# Patient Record
Sex: FEMALE | Race: WHITE | Employment: OTHER | URBAN - NONMETROPOLITAN AREA
[De-identification: names, ages, dates, MRNs, and addresses within clinical notes are randomized per-mention and may not be internally consistent; named-entity substitution may affect disease eponyms.]

---

## 2023-11-12 DIAGNOSIS — I65.23 BILATERAL CAROTID ARTERY STENOSIS: ICD-10-CM

## 2023-11-12 DIAGNOSIS — E78.5 HYPERLIPIDEMIA, UNSPECIFIED HYPERLIPIDEMIA TYPE: ICD-10-CM

## 2023-11-18 PROBLEM — I65.23 BILATERAL CAROTID ARTERY STENOSIS: Status: ACTIVE | Noted: 2023-11-18

## 2023-11-18 PROBLEM — R53.83 FATIGUE: Status: ACTIVE | Noted: 2023-11-18

## 2023-11-18 PROBLEM — E78.5 HYPERLIPIDEMIA: Status: ACTIVE | Noted: 2023-11-18

## 2023-11-18 PROBLEM — I48.21 PERMANENT ATRIAL FIBRILLATION (MULTI): Status: ACTIVE | Noted: 2023-11-18

## 2023-11-18 PROBLEM — R06.02 SHORTNESS OF BREATH: Status: ACTIVE | Noted: 2023-11-18

## 2023-11-18 PROBLEM — I10 ESSENTIAL HYPERTENSION: Status: ACTIVE | Noted: 2023-11-18

## 2023-11-18 RX ORDER — EZETIMIBE 10 MG/1
10 TABLET ORAL DAILY
COMMUNITY
Start: 2023-10-06

## 2023-11-18 RX ORDER — ASPIRIN 325 MG
1 TABLET, DELAYED RELEASE (ENTERIC COATED) ORAL DAILY
COMMUNITY

## 2023-11-18 RX ORDER — METOPROLOL SUCCINATE 25 MG/1
25 TABLET, EXTENDED RELEASE ORAL DAILY
COMMUNITY

## 2023-11-18 RX ORDER — TIZANIDINE 4 MG/1
TABLET ORAL
COMMUNITY

## 2023-11-18 RX ORDER — ROSUVASTATIN CALCIUM 40 MG/1
TABLET, COATED ORAL
COMMUNITY
Start: 2023-07-21 | End: 2024-05-14 | Stop reason: SDUPTHER

## 2023-11-18 RX ORDER — ELECTROLYTES/DEXTROSE
1 SOLUTION, ORAL ORAL DAILY
COMMUNITY

## 2023-11-18 RX ORDER — APIXABAN 5 MG/1
1 TABLET, FILM COATED ORAL 2 TIMES DAILY
COMMUNITY
Start: 2022-02-03

## 2023-11-18 RX ORDER — AMLODIPINE BESYLATE 5 MG/1
1 TABLET ORAL DAILY
COMMUNITY
Start: 2021-01-12

## 2023-11-18 RX ORDER — ASPIRIN 81 MG/1
1 TABLET ORAL DAILY
COMMUNITY
End: 2024-05-14 | Stop reason: ALTCHOICE

## 2023-11-18 RX ORDER — VALSARTAN 80 MG/1
80 TABLET ORAL DAILY
COMMUNITY
Start: 2023-10-06 | End: 2024-04-01

## 2023-11-18 RX ORDER — DULOXETIN HYDROCHLORIDE 30 MG/1
30 CAPSULE, DELAYED RELEASE ORAL DAILY
COMMUNITY
Start: 2023-09-09

## 2023-11-19 RX ORDER — ROSUVASTATIN CALCIUM 40 MG/1
TABLET, COATED ORAL
Qty: 90 TABLET | Refills: 1 | Status: SHIPPED | OUTPATIENT
Start: 2023-11-19 | End: 2024-11-18

## 2024-03-30 DIAGNOSIS — I10 ESSENTIAL HYPERTENSION: ICD-10-CM

## 2024-04-01 RX ORDER — VALSARTAN 80 MG/1
80 TABLET ORAL DAILY
Qty: 90 TABLET | Refills: 3 | Status: SHIPPED | OUTPATIENT
Start: 2024-04-01

## 2024-05-14 ENCOUNTER — OFFICE VISIT (OUTPATIENT)
Dept: CARDIOLOGY | Facility: CLINIC | Age: 84
End: 2024-05-14
Payer: MEDICARE

## 2024-05-14 VITALS
SYSTOLIC BLOOD PRESSURE: 158 MMHG | BODY MASS INDEX: 32.25 KG/M2 | WEIGHT: 182 LBS | DIASTOLIC BLOOD PRESSURE: 86 MMHG | HEART RATE: 70 BPM | HEIGHT: 63 IN

## 2024-05-14 DIAGNOSIS — I48.21 PERMANENT ATRIAL FIBRILLATION (MULTI): Primary | ICD-10-CM

## 2024-05-14 DIAGNOSIS — E78.2 MIXED HYPERLIPIDEMIA: ICD-10-CM

## 2024-05-14 DIAGNOSIS — I10 ESSENTIAL HYPERTENSION: ICD-10-CM

## 2024-05-14 DIAGNOSIS — R53.82 CHRONIC FATIGUE: ICD-10-CM

## 2024-05-14 DIAGNOSIS — R06.02 SHORTNESS OF BREATH: ICD-10-CM

## 2024-05-14 DIAGNOSIS — I65.23 BILATERAL CAROTID ARTERY STENOSIS: ICD-10-CM

## 2024-05-14 DIAGNOSIS — Z87.891 FORMER SMOKER: ICD-10-CM

## 2024-05-14 PROCEDURE — 1159F MED LIST DOCD IN RCRD: CPT | Performed by: INTERNAL MEDICINE

## 2024-05-14 PROCEDURE — 99214 OFFICE O/P EST MOD 30 MIN: CPT | Performed by: INTERNAL MEDICINE

## 2024-05-14 PROCEDURE — 3077F SYST BP >= 140 MM HG: CPT | Performed by: INTERNAL MEDICINE

## 2024-05-14 PROCEDURE — 3079F DIAST BP 80-89 MM HG: CPT | Performed by: INTERNAL MEDICINE

## 2024-05-14 PROCEDURE — 93000 ELECTROCARDIOGRAM COMPLETE: CPT | Performed by: INTERNAL MEDICINE

## 2024-05-14 PROCEDURE — 1160F RVW MEDS BY RX/DR IN RCRD: CPT | Performed by: INTERNAL MEDICINE

## 2024-05-14 PROCEDURE — 1036F TOBACCO NON-USER: CPT | Performed by: INTERNAL MEDICINE

## 2024-05-14 RX ORDER — MAGNESIUM HYDROXIDE 400 MG/5ML
1 SUSPENSION, ORAL (FINAL DOSE FORM) ORAL DAILY
COMMUNITY

## 2024-05-14 RX ORDER — LUTEIN 10 MG
1 TABLET ORAL DAILY
COMMUNITY

## 2024-05-14 ASSESSMENT — ENCOUNTER SYMPTOMS: DYSPNEA ON EXERTION: 1

## 2024-05-14 NOTE — LETTER
May 14, 2024     Vinny Bolden DO  [address]  [facility] [address]    Patient: Desiree Dean   YOB: 1940   Date of Visit: 5/14/2024       Dear Dr. Vinny Bolden, :    Thank you for referring Desiree Dean to me for evaluation. Below are my notes for this consultation.  If you have questions, please do not hesitate to call me. I look forward to following your patient along with you.       Sincerely,     Alexandra Vital MD      CC: No Recipients  ______________________________________________________________________________________    Subjective   Desiree Dean is a 83 y.o. female       Chief Complaint    Follow-up          HPI        Patient is here for follow-up continue management for chronic permanent atrial fibrillation, long-term anticoagulation, bilateral carotid disease, hyperlipidemia and hypertension.  Since last time I saw her she denies any change in cardiac status or symptoms she describes some chronic fatigue and tiredness unchanged from before.  Her EKG showed A-fib with controlled rate.  Her recent laboratory data noted and marked for lipid is suboptimally controlled.  Patient admits she has not been very compliant with taking her medication.  In addition her blood pressure noted to be elevated.    ASSESSMENT:      1. Chronic permanent atrial fibrillation. Heart rate controlled on long-term anticoagulation with Eliquis. Completely asymptomatic  2. Prior complaint of fatigue, tiredness, shortness of breath . Per stress test and echocardiogram were reassuring. Symptoms appears to be stable unchanged from before  3. bilateral carotid disease and carotid January 2023 noted and reviewed with her  4. Hyperlipidemia.  Lipids seem to have worsened patient admits to noncompliance with her medic occasion and has not been taking them regularly  5. Hypertension, optimally controlled but she states that she has not taken her medication in the morning  6. Obesity with no  "significant weight changes           RECOMMENDATION:      1. Patient was advised to continue present medical regimen.  I advised him to have a blood pressure check in few weeks if blood pressure remains elevated will adjust  2. I advised her to lose weight to exercise and continue with risk factor adjustment.  3.  I reviewed her recent lab work with her  4. Risk, benefits and alternative anticoagulation reviewed with patient at length she understood and agreed   5. Follow-up in 1 year    Review of Systems   Constitutional: Positive for malaise/fatigue.   Cardiovascular:  Positive for chest pain and dyspnea on exertion.            Vitals:    05/14/24 1448   BP: 158/86   BP Location: Right arm   Patient Position: Sitting   Pulse: 70   Weight: 82.6 kg (182 lb)   Height: 1.6 m (5' 3\")      EKG done in office today    Objective   Physical Exam  Constitutional:       Appearance: Normal appearance.   HENT:      Nose: Nose normal.   Neck:      Vascular: No carotid bruit.   Cardiovascular:      Rate and Rhythm: Normal rate. Rhythm irregularly irregular.      Pulses: Normal pulses.      Heart sounds: Murmur heard.      Systolic murmur is present with a grade of 2/6.   Pulmonary:      Effort: Pulmonary effort is normal.   Abdominal:      General: Bowel sounds are normal.      Palpations: Abdomen is soft.   Musculoskeletal:         General: Normal range of motion.      Cervical back: Normal range of motion.      Right lower leg: No edema.      Left lower leg: No edema.   Skin:     General: Skin is warm and dry.   Neurological:      General: No focal deficit present.      Mental Status: She is alert.   Psychiatric:         Mood and Affect: Mood normal.         Behavior: Behavior normal.         Thought Content: Thought content normal.         Judgment: Judgment normal.         Allergies  Atorvastatin     Current Medications    Current Outpatient Medications:   •  amLODIPine (Norvasc) 5 mg tablet, Take 1 tablet (5 mg) by mouth " once daily., Disp: , Rfl:   •  biotin 5 mg capsule, Take 1 capsule (5 mg) by mouth once daily., Disp: , Rfl:   •  cholecalciferol (Vitamin D-3) 50,000 unit capsule, Take 1 capsule (50,000 Units) by mouth once daily., Disp: , Rfl:   •  cyanocobalamin, vitamin B-12, 5,000 mcg tablet,disintegrating, 1 tablet once daily., Disp: , Rfl:   •  DULoxetine (Cymbalta) 30 mg DR capsule, Take 1 capsule (30 mg) by mouth once daily., Disp: , Rfl:   •  Eliquis 5 mg tablet, Take 1 tablet (5 mg) by mouth 2 times a day., Disp: , Rfl:   •  ezetimibe (Zetia) 10 mg tablet, Take 1 tablet (10 mg) by mouth once daily., Disp: , Rfl:   •  lutein 10 mg tablet, 1 tablet once daily., Disp: , Rfl:   •  metoprolol succinate XL (Toprol-XL) 25 mg 24 hr tablet, Take 1 tablet (25 mg) by mouth once daily., Disp: , Rfl:   •  rosuvastatin (Crestor) 40 mg tablet, TAKE 1 TABLET BY MOUTH AT BEDTIME. STOP PRAVASTATIN, Disp: 90 tablet, Rfl: 1  •  tiZANidine (Zanaflex) 4 mg tablet, TAKE 1 TABLET BY MOUTH THREE TIMES DAILY AS NEEDED FOR 30 DAYS, Disp: , Rfl:   •  valsartan (Diovan) 80 mg tablet, Take 1 tablet by mouth once daily, Disp: 90 tablet, Rfl: 3                     Assessment/Plan   1. Permanent atrial fibrillation (Multi)  ECG 12 Lead    Follow Up In Cardiology      2. Essential hypertension  Follow Up In Cardiology      3. Mixed hyperlipidemia        4. Bilateral carotid artery stenosis        5. Shortness of breath        6. Chronic fatigue        7. BMI 32.0-32.9,adult        8. Former smoker                 Scribe Attestation  By signing my name below, I, Miriam Valdes LPN   attest that this documentation has been prepared under the direction and in the presence of Alexandra Vital MD.     Provider Attestation - Scribe documentation    All medical record entries made by the Scribe were at my direction and personally dictated by me. I have reviewed the chart and agree that the record accurately reflects my personal performance of the  history, physical exam, discussion and plan.

## 2024-05-14 NOTE — PROGRESS NOTES
Subjective   Desiree Dean is a 83 y.o. female       Chief Complaint    Follow-up          HPI        Patient is here for follow-up continue management for chronic permanent atrial fibrillation, long-term anticoagulation, bilateral carotid disease, hyperlipidemia and hypertension.  Since last time I saw her she denies any change in cardiac status or symptoms she describes some chronic fatigue and tiredness unchanged from before.  Her EKG showed A-fib with controlled rate.  Her recent laboratory data noted and marked for lipid is suboptimally controlled.  Patient admits she has not been very compliant with taking her medication.  In addition her blood pressure noted to be elevated.    ASSESSMENT:      1. Chronic permanent atrial fibrillation. Heart rate controlled on long-term anticoagulation with Eliquis. Completely asymptomatic  2. Prior complaint of fatigue, tiredness, shortness of breath . Per stress test and echocardiogram were reassuring. Symptoms appears to be stable unchanged from before  3. bilateral carotid disease and carotid January 2023 noted and reviewed with her  4. Hyperlipidemia.  Lipids seem to have worsened patient admits to noncompliance with her medic occasion and has not been taking them regularly  5. Hypertension, optimally controlled but she states that she has not taken her medication in the morning  6. Obesity with no significant weight changes           RECOMMENDATION:      1. Patient was advised to continue present medical regimen.  I advised him to have a blood pressure check in few weeks if blood pressure remains elevated will adjust  2. I advised her to lose weight to exercise and continue with risk factor adjustment.  3.  I reviewed her recent lab work with her  4. Risk, benefits and alternative anticoagulation reviewed with patient at length she understood and agreed   5. Follow-up in 1 year    Review of Systems   Constitutional: Positive for malaise/fatigue.   Cardiovascular:   "Positive for chest pain and dyspnea on exertion.            Vitals:    05/14/24 1448   BP: 158/86   BP Location: Right arm   Patient Position: Sitting   Pulse: 70   Weight: 82.6 kg (182 lb)   Height: 1.6 m (5' 3\")      EKG done in office today    Objective   Physical Exam  Constitutional:       Appearance: Normal appearance.   HENT:      Nose: Nose normal.   Neck:      Vascular: No carotid bruit.   Cardiovascular:      Rate and Rhythm: Normal rate. Rhythm irregularly irregular.      Pulses: Normal pulses.      Heart sounds: Murmur heard.      Systolic murmur is present with a grade of 2/6.   Pulmonary:      Effort: Pulmonary effort is normal.   Abdominal:      General: Bowel sounds are normal.      Palpations: Abdomen is soft.   Musculoskeletal:         General: Normal range of motion.      Cervical back: Normal range of motion.      Right lower leg: No edema.      Left lower leg: No edema.   Skin:     General: Skin is warm and dry.   Neurological:      General: No focal deficit present.      Mental Status: She is alert.   Psychiatric:         Mood and Affect: Mood normal.         Behavior: Behavior normal.         Thought Content: Thought content normal.         Judgment: Judgment normal.         Allergies  Atorvastatin     Current Medications    Current Outpatient Medications:     amLODIPine (Norvasc) 5 mg tablet, Take 1 tablet (5 mg) by mouth once daily., Disp: , Rfl:     biotin 5 mg capsule, Take 1 capsule (5 mg) by mouth once daily., Disp: , Rfl:     cholecalciferol (Vitamin D-3) 50,000 unit capsule, Take 1 capsule (50,000 Units) by mouth once daily., Disp: , Rfl:     cyanocobalamin, vitamin B-12, 5,000 mcg tablet,disintegrating, 1 tablet once daily., Disp: , Rfl:     DULoxetine (Cymbalta) 30 mg DR capsule, Take 1 capsule (30 mg) by mouth once daily., Disp: , Rfl:     Eliquis 5 mg tablet, Take 1 tablet (5 mg) by mouth 2 times a day., Disp: , Rfl:     ezetimibe (Zetia) 10 mg tablet, Take 1 tablet (10 mg) by " mouth once daily., Disp: , Rfl:     lutein 10 mg tablet, 1 tablet once daily., Disp: , Rfl:     metoprolol succinate XL (Toprol-XL) 25 mg 24 hr tablet, Take 1 tablet (25 mg) by mouth once daily., Disp: , Rfl:     rosuvastatin (Crestor) 40 mg tablet, TAKE 1 TABLET BY MOUTH AT BEDTIME. STOP PRAVASTATIN, Disp: 90 tablet, Rfl: 1    tiZANidine (Zanaflex) 4 mg tablet, TAKE 1 TABLET BY MOUTH THREE TIMES DAILY AS NEEDED FOR 30 DAYS, Disp: , Rfl:     valsartan (Diovan) 80 mg tablet, Take 1 tablet by mouth once daily, Disp: 90 tablet, Rfl: 3                     Assessment/Plan   1. Permanent atrial fibrillation (Multi)  ECG 12 Lead    Follow Up In Cardiology      2. Essential hypertension  Follow Up In Cardiology      3. Mixed hyperlipidemia        4. Bilateral carotid artery stenosis        5. Shortness of breath        6. Chronic fatigue        7. BMI 32.0-32.9,adult        8. Former smoker                 Scribe Attestation  By signing my name below, I, Archana Valdes LPNe   attest that this documentation has been prepared under the direction and in the presence of Alexandra Vital MD.     Provider Attestation - Scribe documentation    All medical record entries made by the Scribe were at my direction and personally dictated by me. I have reviewed the chart and agree that the record accurately reflects my personal performance of the history, physical exam, discussion and plan.

## 2024-05-14 NOTE — PATIENT INSTRUCTIONS
Please bring all medicines, vitamins, and herbal supplements with you when you come to the office.    Prescriptions will not be filled unless you are compliant with your follow up appointments or have a follow up appointment scheduled as per instruction of your physician. Refills should be requested at the time of your visit.     BMI was above normal measurement. Current weight: 82.6 kg (182 lb)  Weight change since last visit (-) denotes wt loss -2 lbs   Weight loss needed to achieve BMI 25: 41.2 Lbs  Weight loss needed to achieve BMI 30: 13 Lbs  Provided instructions on dietary changes  Provided instructions on exercise.

## 2024-06-14 ENCOUNTER — APPOINTMENT (OUTPATIENT)
Dept: CARDIOLOGY | Facility: CLINIC | Age: 84
End: 2024-06-14
Payer: MEDICARE

## 2024-06-17 ENCOUNTER — APPOINTMENT (OUTPATIENT)
Dept: CARDIOLOGY | Facility: CLINIC | Age: 84
End: 2024-06-17
Payer: MEDICARE

## 2024-06-17 VITALS
BODY MASS INDEX: 32.21 KG/M2 | HEIGHT: 63 IN | WEIGHT: 181.8 LBS | SYSTOLIC BLOOD PRESSURE: 168 MMHG | HEART RATE: 66 BPM | DIASTOLIC BLOOD PRESSURE: 96 MMHG

## 2024-06-17 DIAGNOSIS — I10 ESSENTIAL HYPERTENSION: ICD-10-CM

## 2024-06-17 PROCEDURE — 1036F TOBACCO NON-USER: CPT | Performed by: INTERNAL MEDICINE

## 2024-06-17 PROCEDURE — 3077F SYST BP >= 140 MM HG: CPT | Performed by: INTERNAL MEDICINE

## 2024-06-17 PROCEDURE — 99212 OFFICE O/P EST SF 10 MIN: CPT | Performed by: INTERNAL MEDICINE

## 2024-06-17 PROCEDURE — 1160F RVW MEDS BY RX/DR IN RCRD: CPT | Performed by: INTERNAL MEDICINE

## 2024-06-17 PROCEDURE — 1159F MED LIST DOCD IN RCRD: CPT | Performed by: INTERNAL MEDICINE

## 2024-06-17 PROCEDURE — 3080F DIAST BP >= 90 MM HG: CPT | Performed by: INTERNAL MEDICINE

## 2024-06-17 RX ORDER — AMLODIPINE BESYLATE 10 MG/1
10 TABLET ORAL DAILY
Qty: 90 TABLET | Refills: 3 | Status: SHIPPED | OUTPATIENT
Start: 2024-06-17 | End: 2025-06-17

## 2024-06-17 NOTE — PROGRESS NOTES
"Subjective   Desiree Dean is a 83 y.o. female       Chief Complaint    Blood Pressure Check          HPI   Patient is here for follow-up continue management for hypertension.  Recently she was noted to be hypertensive.  She was advised to monitor her blood pressure and to come back for a recheck.  Her blood pressure remains elevated.  She has not been checking her blood pressure on her own.    Assessment    1.  Hypertension not well-controlled    Plan    1.  I counseled the patient regarding nonpharmacologic approach for management for hypertension including salt restriction, losing weight, exercise and DASH diet  2.  I advised the patient to increase her amlodipine to 10 mg daily and to come back in few weeks for blood pressure check  ROS         Vitals:    06/17/24 1448 06/17/24 1451   BP: 168/90 (!) 168/96   BP Location: Right arm Left arm   Patient Position: Sitting Sitting   Pulse: 66    Weight: 82.5 kg (181 lb 12.8 oz)    Height: 1.6 m (5' 3\")         Objective   Physical Exam    Allergies  Atorvastatin     Current Medications    Current Outpatient Medications:     biotin 5 mg capsule, Take 1 capsule (5 mg) by mouth once daily., Disp: , Rfl:     cholecalciferol (Vitamin D-3) 50,000 unit capsule, Take 1 capsule (50,000 Units) by mouth once daily., Disp: , Rfl:     cyanocobalamin, vitamin B-12, 5,000 mcg tablet,disintegrating, 1 tablet once daily., Disp: , Rfl:     DULoxetine (Cymbalta) 30 mg DR capsule, Take 1 capsule (30 mg) by mouth once daily., Disp: , Rfl:     Eliquis 5 mg tablet, Take 1 tablet (5 mg) by mouth 2 times a day., Disp: , Rfl:     ezetimibe (Zetia) 10 mg tablet, Take 1 tablet (10 mg) by mouth once daily., Disp: , Rfl:     lutein 10 mg tablet, 1 tablet once daily., Disp: , Rfl:     metoprolol succinate XL (Toprol-XL) 25 mg 24 hr tablet, Take 1 tablet (25 mg) by mouth once daily., Disp: , Rfl:     rosuvastatin (Crestor) 40 mg tablet, TAKE 1 TABLET BY MOUTH AT BEDTIME. STOP PRAVASTATIN, Disp: 90 " tablet, Rfl: 1    tiZANidine (Zanaflex) 4 mg tablet, TAKE 1 TABLET BY MOUTH THREE TIMES DAILY AS NEEDED FOR 30 DAYS, Disp: , Rfl:     valsartan (Diovan) 80 mg tablet, Take 1 tablet by mouth once daily, Disp: 90 tablet, Rfl: 3    amLODIPine (Norvasc) 10 mg tablet, Take 1 tablet (10 mg) by mouth once daily., Disp: 90 tablet, Rfl: 3                     Assessment/Plan   1. Essential hypertension  Follow Up In Cardiology    amLODIPine (Norvasc) 10 mg tablet    Follow Up In Cardiology               Scribe Attestation  By signing my name below, I, Diana APPLE LPN   , Teet[place]   attest that this documentation has been prepared under the direction and in the presence of Alexandra Vital MD.     Provider Attestation - Scribe documentation    All medical record entries made by the Scribe were at my direction and personally dictated by me. I have reviewed the chart and agree that the record accurately reflects my personal performance of the history, physical exam, discussion and plan.

## 2024-07-30 DIAGNOSIS — E78.5 HYPERLIPIDEMIA, UNSPECIFIED HYPERLIPIDEMIA TYPE: ICD-10-CM

## 2024-07-30 DIAGNOSIS — I65.23 BILATERAL CAROTID ARTERY STENOSIS: ICD-10-CM

## 2024-07-30 RX ORDER — ROSUVASTATIN CALCIUM 40 MG/1
40 TABLET, COATED ORAL DAILY
Qty: 90 TABLET | Refills: 3 | Status: SHIPPED | OUTPATIENT
Start: 2024-07-30 | End: 2025-07-30

## 2024-07-31 ENCOUNTER — APPOINTMENT (OUTPATIENT)
Dept: CARDIOLOGY | Facility: CLINIC | Age: 84
End: 2024-07-31
Payer: MEDICARE

## 2024-08-27 ENCOUNTER — APPOINTMENT (OUTPATIENT)
Dept: CARDIOLOGY | Facility: CLINIC | Age: 84
End: 2024-08-27
Payer: MEDICARE

## 2024-10-03 ENCOUNTER — APPOINTMENT (OUTPATIENT)
Dept: CARDIOLOGY | Facility: CLINIC | Age: 84
End: 2024-10-03
Payer: MEDICARE

## 2024-10-22 ENCOUNTER — APPOINTMENT (OUTPATIENT)
Dept: CARDIOLOGY | Facility: CLINIC | Age: 84
End: 2024-10-22
Payer: MEDICARE

## 2024-10-22 VITALS
SYSTOLIC BLOOD PRESSURE: 130 MMHG | HEIGHT: 63 IN | HEART RATE: 64 BPM | WEIGHT: 182 LBS | DIASTOLIC BLOOD PRESSURE: 76 MMHG | BODY MASS INDEX: 32.25 KG/M2

## 2024-10-22 DIAGNOSIS — I10 ESSENTIAL HYPERTENSION: ICD-10-CM

## 2024-10-22 DIAGNOSIS — Z87.891 FORMER SMOKER: ICD-10-CM

## 2024-10-22 PROCEDURE — 99212 OFFICE O/P EST SF 10 MIN: CPT | Performed by: INTERNAL MEDICINE

## 2024-10-22 PROCEDURE — 3078F DIAST BP <80 MM HG: CPT | Performed by: INTERNAL MEDICINE

## 2024-10-22 PROCEDURE — 3075F SYST BP GE 130 - 139MM HG: CPT | Performed by: INTERNAL MEDICINE

## 2024-10-22 PROCEDURE — 1159F MED LIST DOCD IN RCRD: CPT | Performed by: INTERNAL MEDICINE

## 2024-10-22 PROCEDURE — G2211 COMPLEX E/M VISIT ADD ON: HCPCS | Performed by: INTERNAL MEDICINE

## 2024-10-22 RX ORDER — VIT C/E/ZN/COPPR/LUTEIN/ZEAXAN 250MG-90MG
25 CAPSULE ORAL DAILY
COMMUNITY

## 2024-10-22 NOTE — PROGRESS NOTES
"Subjective   Desiree Dean is a 83 y.o. female       Chief Complaint    Follow-up          HPI   Patient is here for follow-up.  She recently was seen her blood pressure was suboptimally controlled.  We increased her metoprolol.  Since then she feels well.  Her blood pressure has been well-controlled.  She has no side effect.    Assessment    1.  Hypertension well-controlled    Plan    Continue present medical regimen and follow-up in 6 months  ROS         Vitals:    10/22/24 1321   BP: 130/76   BP Location: Left arm   Patient Position: Sitting   Pulse: 64   Weight: 82.6 kg (182 lb)   Height: 1.6 m (5' 3\")        Objective   Physical Exam    Allergies  Atorvastatin     Current Medications    Current Outpatient Medications:     amLODIPine (Norvasc) 10 mg tablet, Take 1 tablet (10 mg) by mouth once daily., Disp: 90 tablet, Rfl: 3    biotin 5 mg capsule, Take 1 capsule (5 mg) by mouth once daily., Disp: , Rfl:     cholecalciferol (Vitamin D3) 25 MCG (1000 UT) capsule, Take 1 capsule (25 mcg) by mouth once daily., Disp: , Rfl:     cyanocobalamin, vitamin B-12, 5,000 mcg tablet,disintegrating, 1 tablet once daily., Disp: , Rfl:     DULoxetine (Cymbalta) 30 mg DR capsule, Take 1 capsule (30 mg) by mouth once daily., Disp: , Rfl:     Eliquis 5 mg tablet, Take 1 tablet (5 mg) by mouth 2 times a day., Disp: , Rfl:     ezetimibe (Zetia) 10 mg tablet, Take 1 tablet (10 mg) by mouth once daily., Disp: , Rfl:     lutein 10 mg tablet, 1 tablet once daily., Disp: , Rfl:     metoprolol succinate XL (Toprol-XL) 25 mg 24 hr tablet, Take 1 tablet (25 mg) by mouth once daily., Disp: , Rfl:     rosuvastatin (Crestor) 40 mg tablet, Take 1 tablet (40 mg) by mouth once daily., Disp: 90 tablet, Rfl: 3    tiZANidine (Zanaflex) 4 mg tablet, TAKE 1 TABLET BY MOUTH THREE TIMES DAILY AS NEEDED FOR 30 DAYS, Disp: , Rfl:     valsartan (Diovan) 80 mg tablet, Take 1 tablet by mouth once daily, Disp: 90 tablet, Rfl: 3         "             Assessment/Plan   1. Essential hypertension  Follow Up In Cardiology      2. BMI 32.0-32.9,adult        3. Former smoker                 Scribe Attestation  By signing my name below, INan LPN   , Miriam   attest that this documentation has been prepared under the direction and in the presence of Alexandra Vital MD.     Provider Attestation - Scribe documentation    All medical record entries made by the Scribe were at my direction and personally dictated by me. I have reviewed the chart and agree that the record accurately reflects my personal performance of the history, physical exam, discussion and plan.

## 2024-10-22 NOTE — PATIENT INSTRUCTIONS
BMI was above normal measurement. Current weight: 82.6 kg (182 lb)  Weight change since last visit (-) denotes wt loss 0.2 lbs   Weight loss needed to achieve BMI 25: 41.2 Lbs  Weight loss needed to achieve BMI 30: 13 Lbs  Provided instructions on dietary changes.    May visit

## 2024-12-27 DIAGNOSIS — E78.2 MIXED HYPERLIPIDEMIA: ICD-10-CM

## 2024-12-28 RX ORDER — EZETIMIBE 10 MG/1
10 TABLET ORAL DAILY
Qty: 90 TABLET | Refills: 3 | Status: SHIPPED | OUTPATIENT
Start: 2024-12-28 | End: 2025-12-28

## 2025-05-14 ENCOUNTER — APPOINTMENT (OUTPATIENT)
Dept: CARDIOLOGY | Facility: CLINIC | Age: 85
End: 2025-05-14
Payer: MEDICARE

## 2025-05-14 VITALS
WEIGHT: 174 LBS | HEART RATE: 82 BPM | SYSTOLIC BLOOD PRESSURE: 130 MMHG | DIASTOLIC BLOOD PRESSURE: 76 MMHG | BODY MASS INDEX: 30.83 KG/M2 | HEIGHT: 63 IN

## 2025-05-14 DIAGNOSIS — I65.23 BILATERAL CAROTID ARTERY STENOSIS: ICD-10-CM

## 2025-05-14 DIAGNOSIS — I10 ESSENTIAL HYPERTENSION: ICD-10-CM

## 2025-05-14 DIAGNOSIS — R06.02 SHORTNESS OF BREATH: ICD-10-CM

## 2025-05-14 DIAGNOSIS — R53.82 CHRONIC FATIGUE: ICD-10-CM

## 2025-05-14 DIAGNOSIS — I48.21 PERMANENT ATRIAL FIBRILLATION (MULTI): Primary | ICD-10-CM

## 2025-05-14 DIAGNOSIS — E78.2 MIXED HYPERLIPIDEMIA: ICD-10-CM

## 2025-05-14 DIAGNOSIS — Z87.891 FORMER SMOKER: ICD-10-CM

## 2025-05-14 PROCEDURE — 3078F DIAST BP <80 MM HG: CPT | Performed by: INTERNAL MEDICINE

## 2025-05-14 PROCEDURE — 1160F RVW MEDS BY RX/DR IN RCRD: CPT | Performed by: INTERNAL MEDICINE

## 2025-05-14 PROCEDURE — G2211 COMPLEX E/M VISIT ADD ON: HCPCS | Performed by: INTERNAL MEDICINE

## 2025-05-14 PROCEDURE — 1159F MED LIST DOCD IN RCRD: CPT | Performed by: INTERNAL MEDICINE

## 2025-05-14 PROCEDURE — 3075F SYST BP GE 130 - 139MM HG: CPT | Performed by: INTERNAL MEDICINE

## 2025-05-14 PROCEDURE — 99214 OFFICE O/P EST MOD 30 MIN: CPT | Performed by: INTERNAL MEDICINE

## 2025-05-14 PROCEDURE — 1036F TOBACCO NON-USER: CPT | Performed by: INTERNAL MEDICINE

## 2025-05-14 RX ORDER — ASPIRIN 81 MG/1
81 TABLET ORAL
COMMUNITY

## 2025-05-14 NOTE — PATIENT INSTRUCTIONS
Please bring all medicines, vitamins, and herbal supplements with you when you come to the office.    Prescriptions will not be filled unless you are compliant with your follow up appointments or have a follow up appointment scheduled as per instruction of your physician. Refills should be requested at the time of your visit.     BMI was above normal measurement. Current weight: 78.9 kg (174 lb)  Weight change since last visit (-) denotes wt loss -8 lbs   Weight loss needed to achieve BMI 25: 33.2 Lbs  Weight loss needed to achieve BMI 30: 5 Lbs  Provided instructions on dietary changes  Provided instructions on exercise.

## 2025-05-14 NOTE — LETTER
May 14, 2025     Vinny Bolden DO  [address]  [facility] [address]    Patient: Desiree Dean   YOB: 1940   Date of Visit: 5/14/2025       Dear Dr. Vinny Bolden, :    Thank you for referring Desiree Dean to me for evaluation. Below are my notes for this consultation.  If you have questions, please do not hesitate to call me. I look forward to following your patient along with you.       Sincerely,     Alexandra Vital MD      CC: No Recipients  ______________________________________________________________________________________    Chief Complaint   Patient presents with   • Follow-up     1 year  for Permanent atrial fibrillation (Multi)         Subjective   Desiree Dean is a 84 y.o. female     HPI        Patient is here for follow-up continue management for history of permanent atrial fibrillation treated with heart rate control and long-term anticoagulation, bilateral carotid disease, hyperlipidemia and hypertension.  Since last time I saw her she denies any change in cardiac status or symptoms but reports she has been having some musculoskeletal type of left leg pain for which she received some injection.  Labs noted and reviewed with her overall she appears to be stable she reported few brief episodes of mid back pain but does not appear to be cardiac based on her description.    ASSESSMENT:      1. Chronic permanent atrial fibrillation. Heart rate controlled on long-term anticoagulation with Eliquis. Completely asymptomatic and heart rate is well-controlled  2. Prior complaint of fatigue, tiredness, shortness of breath . Per stress test and echocardiogram were reassuring. Symptoms appears to be stable unchanged from before  3. bilateral carotid disease and carotid January 2023 noted and reviewed with her she is on appropriate therapy with low-dose aspirin and statin  4. Hyperlipidemia.  On atorvastatin rosuvastatin and Zetia.  Seem to improve with better compliance.  Her  "LDL is 49 well-controlled  5. Hypertension, well-controlled on metoprolol, amlodipine and valsartan with no side effect  6. Obesity with no significant weight changes  7.  Left leg pain appears to be musculoskeletal receiving injection  8.  Brief 2 episode of mid back pain appears also musculoskeletal           RECOMMENDATION:      1. Patient was advised to continue present medical regimen.  I advised her to continue to monitor her blood pressure  2. I advised her to lose weight to exercise and continue with risk factor adjustment.  3.  I reviewed her recent lab work with her  4. Risk, benefits and alternative anticoagulation reviewed with patient at length she understood and agreed   5.  I advised her to repeat her carotid Doppler and we will see her back in 6 months with an EKG  Review of Systems   All other systems reviewed and are negative.           Vitals:    05/14/25 1419   BP: 130/76   BP Location: Left arm   Patient Position: Sitting   Pulse: 82   Weight: 78.9 kg (174 lb)   Height: 1.6 m (5' 3\")        Objective   Physical Exam  Constitutional:       Appearance: Normal appearance.   HENT:      Nose: Nose normal.   Neck:      Vascular: No carotid bruit.   Cardiovascular:      Rate and Rhythm: Normal rate. Rhythm irregularly irregular.      Pulses: Normal pulses.      Heart sounds: Normal heart sounds.   Pulmonary:      Effort: Pulmonary effort is normal.   Abdominal:      General: Bowel sounds are normal.      Palpations: Abdomen is soft.   Musculoskeletal:         General: Normal range of motion.      Cervical back: Normal range of motion.      Right lower leg: No edema.      Left lower leg: No edema.   Skin:     General: Skin is warm and dry.   Neurological:      General: No focal deficit present.      Mental Status: She is alert.   Psychiatric:         Mood and Affect: Mood normal.         Behavior: Behavior normal.         Thought Content: Thought content normal.         Judgment: Judgment normal. "         Allergies  Atorvastatin     Current Medications  Current Outpatient Medications   Medication Instructions   • amLODIPine (NORVASC) 10 mg, oral, Daily   • aspirin 81 mg, Every Sun   • biotin 5 mg capsule 1 capsule, Daily   • cholecalciferol (VITAMIN D3) 25 mcg, Daily   • cyanocobalamin, vitamin B-12, 5,000 mcg tablet,disintegrating 1 tablet, Daily   • DULoxetine (CYMBALTA) 30 mg, Daily   • Eliquis 5 mg tablet 1 tablet, 2 times daily   • ezetimibe (ZETIA) 10 mg, oral, Daily   • lutein 10 mg tablet 1 tablet, Daily   • metoprolol succinate XL (TOPROL-XL) 25 mg, Daily   • rosuvastatin (CRESTOR) 40 mg, oral, Daily   • tiZANidine (Zanaflex) 4 mg tablet TAKE 1 TABLET BY MOUTH THREE TIMES DAILY AS NEEDED FOR 30 DAYS   • valsartan (DIOVAN) 80 mg, oral, Daily                        Assessment/Plan   1. Permanent atrial fibrillation (Multi)  Follow Up In Cardiology      2. Mixed hyperlipidemia  Follow Up In Cardiology      3. Essential hypertension        4. Bilateral carotid artery stenosis  Vascular US Carotid Artery Duplex Bilateral      5. Shortness of breath        6. Chronic fatigue        7. Former smoker        8. BMI 30.0-30.9,adult                 Scribe Attestation  By signing my name below, IMelva LPN, Scribe   attest that this documentation has been prepared under the direction and in the presence of Alexandra Vital MD.     Provider Attestation - Scribe documentation    All medical record entries made by the Scribe were at my direction and personally dictated by me. I have reviewed the chart and agree that the record accurately reflects my personal performance of the history, physical exam, discussion and plan.

## 2025-05-14 NOTE — PROGRESS NOTES
Chief Complaint   Patient presents with    Follow-up     1 year  for Permanent atrial fibrillation (Multi)         Subjective   Desiree Dean is a 84 y.o. female     HPI        Patient is here for follow-up continue management for history of permanent atrial fibrillation treated with heart rate control and long-term anticoagulation, bilateral carotid disease, hyperlipidemia and hypertension.  Since last time I saw her she denies any change in cardiac status or symptoms but reports she has been having some musculoskeletal type of left leg pain for which she received some injection.  Labs noted and reviewed with her overall she appears to be stable she reported few brief episodes of mid back pain but does not appear to be cardiac based on her description.    ASSESSMENT:      1. Chronic permanent atrial fibrillation. Heart rate controlled on long-term anticoagulation with Eliquis. Completely asymptomatic and heart rate is well-controlled  2. Prior complaint of fatigue, tiredness, shortness of breath . Per stress test and echocardiogram were reassuring. Symptoms appears to be stable unchanged from before  3. bilateral carotid disease and carotid January 2023 noted and reviewed with her she is on appropriate therapy with low-dose aspirin and statin  4. Hyperlipidemia.  On atorvastatin rosuvastatin and Zetia.  Seem to improve with better compliance.  Her LDL is 49 well-controlled  5. Hypertension, well-controlled on metoprolol, amlodipine and valsartan with no side effect  6. Obesity with no significant weight changes  7.  Left leg pain appears to be musculoskeletal receiving injection  8.  Brief 2 episode of mid back pain appears also musculoskeletal           RECOMMENDATION:      1. Patient was advised to continue present medical regimen.  I advised her to continue to monitor her blood pressure  2. I advised her to lose weight to exercise and continue with risk factor adjustment.  3.  I reviewed her recent lab work with  "her  4. Risk, benefits and alternative anticoagulation reviewed with patient at length she understood and agreed   5.  I advised her to repeat her carotid Doppler and we will see her back in 6 months with an EKG  Review of Systems   All other systems reviewed and are negative.           Vitals:    05/14/25 1419   BP: 130/76   BP Location: Left arm   Patient Position: Sitting   Pulse: 82   Weight: 78.9 kg (174 lb)   Height: 1.6 m (5' 3\")        Objective   Physical Exam  Constitutional:       Appearance: Normal appearance.   HENT:      Nose: Nose normal.   Neck:      Vascular: No carotid bruit.   Cardiovascular:      Rate and Rhythm: Normal rate. Rhythm irregularly irregular.      Pulses: Normal pulses.      Heart sounds: Normal heart sounds.   Pulmonary:      Effort: Pulmonary effort is normal.   Abdominal:      General: Bowel sounds are normal.      Palpations: Abdomen is soft.   Musculoskeletal:         General: Normal range of motion.      Cervical back: Normal range of motion.      Right lower leg: No edema.      Left lower leg: No edema.   Skin:     General: Skin is warm and dry.   Neurological:      General: No focal deficit present.      Mental Status: She is alert.   Psychiatric:         Mood and Affect: Mood normal.         Behavior: Behavior normal.         Thought Content: Thought content normal.         Judgment: Judgment normal.         Allergies  Atorvastatin     Current Medications  Current Outpatient Medications   Medication Instructions    amLODIPine (NORVASC) 10 mg, oral, Daily    aspirin 81 mg, Every Sun    biotin 5 mg capsule 1 capsule, Daily    cholecalciferol (VITAMIN D3) 25 mcg, Daily    cyanocobalamin, vitamin B-12, 5,000 mcg tablet,disintegrating 1 tablet, Daily    DULoxetine (CYMBALTA) 30 mg, Daily    Eliquis 5 mg tablet 1 tablet, 2 times daily    ezetimibe (ZETIA) 10 mg, oral, Daily    lutein 10 mg tablet 1 tablet, Daily    metoprolol succinate XL (TOPROL-XL) 25 mg, Daily    rosuvastatin " (CRESTOR) 40 mg, oral, Daily    tiZANidine (Zanaflex) 4 mg tablet TAKE 1 TABLET BY MOUTH THREE TIMES DAILY AS NEEDED FOR 30 DAYS    valsartan (DIOVAN) 80 mg, oral, Daily                        Assessment/Plan   1. Permanent atrial fibrillation (Multi)  Follow Up In Cardiology      2. Mixed hyperlipidemia  Follow Up In Cardiology      3. Essential hypertension        4. Bilateral carotid artery stenosis  Vascular US Carotid Artery Duplex Bilateral      5. Shortness of breath        6. Chronic fatigue        7. Former smoker        8. BMI 30.0-30.9,adult                 Scribe Attestation  By signing my name below, IMelva LPN, Scribe   attest that this documentation has been prepared under the direction and in the presence of Alexandra Vital MD.     Provider Attestation - Scribe documentation    All medical record entries made by the Scribe were at my direction and personally dictated by me. I have reviewed the chart and agree that the record accurately reflects my personal performance of the history, physical exam, discussion and plan.

## 2025-07-10 ENCOUNTER — APPOINTMENT (OUTPATIENT)
Dept: CARDIOLOGY | Facility: CLINIC | Age: 85
End: 2025-07-10
Payer: MEDICARE

## 2025-12-17 ENCOUNTER — APPOINTMENT (OUTPATIENT)
Dept: CARDIOLOGY | Facility: CLINIC | Age: 85
End: 2025-12-17
Payer: MEDICARE